# Patient Record
Sex: MALE
[De-identification: names, ages, dates, MRNs, and addresses within clinical notes are randomized per-mention and may not be internally consistent; named-entity substitution may affect disease eponyms.]

---

## 2018-09-24 ENCOUNTER — HOSPITAL ENCOUNTER (EMERGENCY)
Dept: HOSPITAL 42 - ED | Age: 33
Discharge: HOME | End: 2018-09-24
Payer: COMMERCIAL

## 2018-09-24 VITALS — OXYGEN SATURATION: 100 % | HEART RATE: 98 BPM | SYSTOLIC BLOOD PRESSURE: 122 MMHG | DIASTOLIC BLOOD PRESSURE: 65 MMHG

## 2018-09-24 VITALS — RESPIRATION RATE: 18 BRPM

## 2018-09-24 VITALS — BODY MASS INDEX: 23.7 KG/M2

## 2018-09-24 DIAGNOSIS — Z23: ICD-10-CM

## 2018-09-24 DIAGNOSIS — S61.217A: Primary | ICD-10-CM
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
## 2018-09-24 NOTE — ED PDOC
Arrival/HPI





<Jeramie Avila - Last Filed: 09/24/18 21:44>





- General


Historian: Patient





<Brooklynn Infante - Last Filed: 09/24/18 21:45>





- General


Chief Complaint: Finger,Hand,&Wrist


Time Seen by Provider: 09/24/18 19:45





- History of Present Illness


Narrative History of Present Illness (Text): 





09/24/18 20:04





Pt is a 32 yo M with no significant Pmhx who presents with a crush injury to the

left 5th digit that occured about 30 mins ago. Pt states that he was at the EMS 

station and was exchanging O2 canisters, when one slipped and fell on his left 

5th digit. He states that after it fell, he removed his hand from under the O2 

canister and saw that his finger was bleeding. He cleaned the wound with normal 

saline and wrapped it and presented to the Emergency department. He currently 

states that pain is a 3/10 non-radiating and that the wound has now since 

stopped bleeding. He is able to move the digit and has no pain proximal to the 

distal DIP joint on the left 5th finger. He has no numbness proximal to the DIP 

joint on same finger. 





Past medical history: None


Pshx: None


Medications: None


Allergies: NKDA


Social Hx: No tobacco use or hx, social drinker, no illicit drug use


Family Hx: Denies


 (Brooklynn Infante)





Past Medical History





- Psychiatric


Hx Substance Use: No





- Anesthesia


Hx Anesthesia: No





<Brooklynn Infante - Last Filed: 09/24/18 21:45>





Family/Social History


Family/Social History: No Known Family HX


Smoking Status: Never Smoked


Hx Alcohol Use: Yes


Frequency of alcohol use: Socially


Hx Substance Use: No





<Brooklynn Infante - Last Filed: 09/24/18 21:45>





Allergies/Home Meds





<MargaritaJeramie - Last Filed: 09/24/18 21:44>





<Brooklynn Infante - Last Filed: 09/24/18 21:45>


Allergies/Adverse Reactions: 


Allergies





No Known Allergies Allergy (Verified 09/24/18 19:42)


   








Home Medications: 


                                    Home Meds











 Medication  Instructions  Recorded  Confirmed


 


No Known Home Med  09/24/18 09/24/18














Review of Systems





- Physician Review


All systems were reviewed & negative as marked: Yes





- Review of Systems


Constitutional: Normal.  absent: Fatigue


Respiratory: Normal.  absent: Cough, Wheezing


Cardiovascular: Normal.  absent: Chest Pain, Palpitations


Gastrointestinal: Normal.  absent: Abdominal Pain, Stool Changes, Constipation


Musculoskeletal: Normal


Skin: Laceration


Neurological: Normal


Psychiatric: Normal





<Brooklynn Infante - Last Filed: 09/24/18 21:45>





Physical Exam


Temperature: Afebrile


Blood Pressure: Normal


Pulse: Tachycardic


Respiratory Rate: Normal


Appearance: Positive for: Well-Appearing, Non-Toxic, Comfortable


Pain Distress: Mild (Rated 3/10)


Mental Status: Positive for: Alert and Oriented X 3





- Systems Exam


Head: Present: Atraumatic, Normocephalic


Pupils: Present: PERRL


Extroacular Muscles: Present: EOMI


Conjunctiva: Present: Normal


Mouth: Present: Moist Mucous Membranes


Respiratory/Chest: Present: Clear to Auscultation, Good Air Exchange.  No: 

Respiratory Distress, Accessory Muscle Use, Wheezes, Rhonchi


Cardiovascular: Present: Regular Rate and Rhythm, Normal S1, S2.  No: Murmurs, 

Irregular Rhythm


Abdomen: Present: Normal Bowel Sounds.  No: Tenderness, Distention, Peritoneal 

Signs


Upper Extremity: Present: Normal ROM, Swelling, Erythema (Localized to the L 5th

digit. Also has 1/2 cm laceration present on volar pad of L 5th digit.), 

Neurovascularly Intact





<Brooklynn Infante - Last Filed: 09/24/18 21:45>


Vital Signs











  Pulse Resp Pulse Ox


 


 09/24/18 19:42  108 H  18  98














Medical Decision Making





<Jeramie Avila - Last Filed: 09/24/18 21:44>





<Brooklynn Infante - Last Filed: 09/24/18 21:45>


ED Course and Treatment: 


Impression:


Pt seen and evaluated with medical resident. Pt states an oxygen tank fell on to

his left 5th digit and sustained a laceration to the area.  Aware and agree with

HPI, clinical findings, plan, and management.








Plan:


-- XR Left Hand


--Reassess and disposition.








Laceration performed by medical resident under my supervision.





PROCEDURE: LACERATION REPAIR


Performed by the emergency provider


Location: left 5th digit


Length: 0.5 cm


Description: clean wound edges, no foreign bodies


Distal CMS: Normal. No deficits. Neurovascularly intact.


Anesthesia: Lidocaine 1%


Preparation: The wound was cleaned with NS and Betadyne. The area was prepped 

and draped in


the usual sterile fashion.


Exploration: The wound was explored and no foreign bodies were found.


Procedure: The wound was closed with 6-0 nylon. There was good approximation. In

total, 3 stitches were used.


Post-Procedure: Good closure and hemostasis. The patient tolerated the procedure

well and there


were no complications. CSM remains intact. Post procedure dressing applied.


 (Jeramie Avila)





- RAD Interpretation


Narrative RAD Interpretations (Text): 





09/24/18 20:18


L hand XR: no acute process


09/24/18 21:23


 (Brooklynn Infante)


Radiology Orders: 








09/24/18 19:46


HAND LEFT 5TH DIGIT (FINGER) [RAD] Stat 














- Medication Orders


Current Medication Orders: 











Discontinued Medications





Lidocaine HCl (Lidocaine 1% 5 Ml)  20 mg IJ STAT STA


   Stop: 09/24/18 20:23


   Last Admin: 09/24/18 20:37 Dose:  Not Given


Tetanus/Reduced Diphtheria/Acell Pertussis (Boostrix Vaccine Inj)  0.5 ml IM 

.ONCE ONE


   Stop: 09/24/18 20:20


   Last Admin: 09/24/18 20:37 Dose:  0.5 ml











Disposition/Present on Arrival





- Present on Arrival


Any Indicators Present on Arrival: No





- Disposition


Have Diagnosis and Disposition been Completed?: Yes


Disposition Time: 21:04


Patient Plan: Discharge





<Jeramie Avila - Last Filed: 09/24/18 21:44>





- Present on Arrival


History of DVT/PE: No


History of Uncontrolled Diabetes: No


Urinary Catheter: No


History of Decub. Ulcer: No


History Surgical Site Infection Following: None





<Brooklynn Infante - Last Filed: 09/24/18 21:45>





- Disposition


Diagnosis: 


 Finger injury, Finger laceration





Patient Problems: 


                             Current Active Problems











Problem Status Onset


 


Finger injury Acute                     


 


Finger laceration Acute                 











Condition: GOOD


Discharge Instructions (ExitCare):  Laceration Repair With Stitches (DC)


Additional Instructions: 


Keep wound clean and dry/follow up with your doctor in 5-7 days for suture 

removal


Referrals: 


PCP,NO [Primary Care Provider] - Follow up with primary


Forms:  [employer] (English), WORK NOTE

## 2018-09-25 NOTE — RAD
Date of service: 



09/24/2018



PROCEDURE:  Left small finger radiographs.



HISTORY:

Crush injury to finger



COMPARISON:

None.



TECHNIQUE:

AP radiograph of the left hand, as well as spot oblique and lateral 

images of left small finger were obtained.



FINDINGS:



LEFT SMALL FINGER:

Left small finger normal, without fracture of focal lesion. Remainder 

of the left hand (as seen on the AP view) is grossly unremarkable.



JOINTS:

Normal. 



SOFT TISSUES:

Normal. 



OTHER FINDINGS:

None.



IMPRESSION:

Normal left small finger radiographs.